# Patient Record
Sex: FEMALE | Race: OTHER | Employment: UNEMPLOYED | URBAN - METROPOLITAN AREA
[De-identification: names, ages, dates, MRNs, and addresses within clinical notes are randomized per-mention and may not be internally consistent; named-entity substitution may affect disease eponyms.]

---

## 2017-10-27 PROBLEM — G56.03 BILATERAL CARPAL TUNNEL SYNDROME: Status: ACTIVE | Noted: 2017-10-27

## 2017-10-27 PROBLEM — E11.9 TYPE 2 DIABETES MELLITUS WITHOUT COMPLICATION, WITHOUT LONG-TERM CURRENT USE OF INSULIN (HCC): Status: ACTIVE | Noted: 2017-10-27

## 2017-10-27 PROBLEM — E78.00 HYPERCHOLESTEREMIA: Status: ACTIVE | Noted: 2017-10-27

## 2018-02-13 PROBLEM — N61.1 BREAST ABSCESS: Status: ACTIVE | Noted: 2018-02-13

## 2018-07-19 ENCOUNTER — OFFICE VISIT (OUTPATIENT)
Dept: INTERNAL MEDICINE | Age: 43
End: 2018-07-19
Payer: COMMERCIAL

## 2018-07-19 VITALS
TEMPERATURE: 98.6 F | SYSTOLIC BLOOD PRESSURE: 112 MMHG | HEART RATE: 60 BPM | RESPIRATION RATE: 16 BRPM | DIASTOLIC BLOOD PRESSURE: 76 MMHG | HEIGHT: 60 IN

## 2018-07-19 DIAGNOSIS — E78.00 HYPERCHOLESTEREMIA: ICD-10-CM

## 2018-07-19 DIAGNOSIS — G56.21 CUBITAL TUNNEL SYNDROME ON RIGHT: ICD-10-CM

## 2018-07-19 DIAGNOSIS — Z00.00 HEALTHCARE MAINTENANCE: ICD-10-CM

## 2018-07-19 DIAGNOSIS — E11.9 TYPE 2 DIABETES MELLITUS WITHOUT COMPLICATION, WITHOUT LONG-TERM CURRENT USE OF INSULIN (HCC): Primary | ICD-10-CM

## 2018-07-19 LAB — HBA1C MFR BLD: 7.7 %

## 2018-07-19 PROCEDURE — 4004F PT TOBACCO SCREEN RCVD TLK: CPT | Performed by: INTERNAL MEDICINE

## 2018-07-19 PROCEDURE — 99214 OFFICE O/P EST MOD 30 MIN: CPT | Performed by: INTERNAL MEDICINE

## 2018-07-19 PROCEDURE — G8417 CALC BMI ABV UP PARAM F/U: HCPCS | Performed by: INTERNAL MEDICINE

## 2018-07-19 PROCEDURE — G8427 DOCREV CUR MEDS BY ELIG CLIN: HCPCS | Performed by: INTERNAL MEDICINE

## 2018-07-19 PROCEDURE — 2022F DILAT RTA XM EVC RTNOPTHY: CPT | Performed by: INTERNAL MEDICINE

## 2018-07-19 PROCEDURE — 3045F PR MOST RECENT HEMOGLOBIN A1C LEVEL 7.0-9.0%: CPT | Performed by: INTERNAL MEDICINE

## 2018-07-19 PROCEDURE — 99212 OFFICE O/P EST SF 10 MIN: CPT | Performed by: INTERNAL MEDICINE

## 2018-07-19 PROCEDURE — 83036 HEMOGLOBIN GLYCOSYLATED A1C: CPT | Performed by: INTERNAL MEDICINE

## 2018-07-19 RX ORDER — GLUCOSAMINE HCL/CHONDROITIN SU 500-400 MG
CAPSULE ORAL
Qty: 100 STRIP | Refills: 3 | Status: SHIPPED | OUTPATIENT
Start: 2018-07-19 | End: 2018-10-17 | Stop reason: SDUPTHER

## 2018-07-19 RX ORDER — ATORVASTATIN CALCIUM 20 MG/1
20 TABLET, FILM COATED ORAL DAILY
Qty: 30 TABLET | Refills: 3 | Status: SHIPPED | OUTPATIENT
Start: 2018-07-19 | End: 2018-10-17 | Stop reason: SDUPTHER

## 2018-07-19 RX ORDER — ALOGLIPTIN 25 MG/1
25 TABLET, FILM COATED ORAL DAILY
Qty: 30 TABLET | Refills: 3 | Status: SHIPPED | OUTPATIENT
Start: 2018-07-19 | End: 2018-10-17 | Stop reason: SDUPTHER

## 2018-07-19 RX ORDER — LANCETS 30 GAUGE
EACH MISCELLANEOUS
Qty: 100 EACH | Refills: 3 | Status: SHIPPED | OUTPATIENT
Start: 2018-07-19 | End: 2018-10-17 | Stop reason: SDUPTHER

## 2018-07-19 RX ORDER — ALBUTEROL SULFATE 90 UG/1
2 AEROSOL, METERED RESPIRATORY (INHALATION) EVERY 6 HOURS PRN
Qty: 1 INHALER | Status: CANCELLED | OUTPATIENT
Start: 2018-07-19

## 2018-07-19 ASSESSMENT — ENCOUNTER SYMPTOMS
RESPIRATORY NEGATIVE: 1
DIARRHEA: 0

## 2018-07-19 NOTE — PROGRESS NOTES
Discharge instructions given. by Dr Erickson Click Patient verbalizes understanding.
A1C)  -     Lancets MISC; Monitor blood glucose every day or every other day  -     blood glucose monitor strips; Monitor blood glucose every day or every other day  -     alogliptin (NESINA) 25 MG TABS tablet; Take 1 tablet by mouth daily  -     metFORMIN (GLUCOPHAGE) 500 MG tablet; Take 1.5 tablets by mouth 2 times daily (with meals)  -     Lashonda Burris MD ([facility])  -     MICROALBUMIN / CREATININE URINE RATIO; Future  - Foot exam done 3 months ago  - Eye exam done but not a diabetes eye exam    Hypercholesteremia  -     atorvastatin (LIPITOR) 20 MG tablet; Take 1 tablet by mouth daily    Healthcare maintenance  -     HIV-1 AND HIV-2 ANTIBODIES; Future    Cubital tunnel syndrome on right  -     Misc. Devices MISC; Elbow splint    Other orders  -     Cancel: albuterol sulfate HFA (VENTOLIN HFA) 108 (90 Base) MCG/ACT inhaler;  Inhale 2 puffs into the lungs every 6 hours as needed for Wheezing    HM:  Check HIV    Erika Shi MD

## 2018-07-19 NOTE — PATIENT INSTRUCTIONS
yanez equilibrio. Las pastillas para dormir o los sedantes pueden afectar yanez equilibrio. · Limite la cantidad de alcohol que rashawn. El alcohol puede alterar yanez equilibrio y otros sentidos. · Pregúntele a yanez médico si los callos o las callosidades deben ser eliminados de kaylan pies. Si [place] un calzado holgado a causa de los callos o las callosidades, podría perder el equilibrio y caerse. · Hable con yanez médico si tiene entumecimiento en los pies. [address] [place] [address] en el hogar  · Quite escalones en la elmer de entrada, alfombrillas y obstáculos. Fije las alfombras sueltas o repare las áreas levantadas del piso. · Mueva los muebles y los cables eléctricos para que no estén en los pasillos. · Utilice cera antideslizante para pisos, y seque de inmediato cualquier derrame que se produzca, sobre todo si el piso es de baldosas de cerámica. · Si [place] elina andadera o un bastón, colóquele un revestimiento de goma en las puntas. Si utiliza muletas, limpie la base regularmente con un paño abrasivo, flora un estropajo de jacquelyn. · Mantenga la casa aminta maricruz, sobre todo las Mercedes, los porches y los pasillos exteriores. Utilice lamparitas nocturnas en áreas flora vestíbulos y brad. Ponga interruptores de britt adicionales o utilice interruptores a distancia (flora los que se encienden o apagan al aplaudir) para que sea más fácil encender las luces si tiene que levantarse por las noches. · Instale pasamanos o barandillas sólidos en las escaleras. · Ponga los artículos que más utiliza en los estantes bajos de los gabinetes (aproximadamente a la altura de yanez cintura). · Tenga un tel[facility]fono [facility] y [facility] linterna con baterías nuevas cerca de yanez cama. Si es posible, coloque un teléfono en cada elina de las habitaciones de yanez casa, o lleve siempre un celular en naveen de que se caiga y no pueda llegar al teléfono.  O puede usar un dispositivo en el alessandro o la edmar en el que presione un botón para enviar elina señal pidiendo ayuda.  · Use zapatos de tacón bajo que le queden aminta y le den buen apoyo a kaylan pies. Use calzado con suelas antideslizantes. Revise los tacones y las suelas de kaylan zapatos antes de usarlos. Repare o reemplace los tacones o las suelas desgastados. · No camine en medias sobre pisos [facility]. · Camine por el [facility] aceras estén resbalosas. Si vive en eilna localidad donde hay yocasta y hielo en invierno, coloque sal sobre los escalones y las aceras resbaladizos. Cómo prevenir las caídas en el baño  · Instale agarraderas y tapetes antideslizantes dentro y fuera de la ducha o la lou, así flora cerca del inodoro y el lavabo. · Utilice elina silla para la ducha y un banco para la bañera. · Use elina khoa de ducha portátil que le permite sentarse mientras se ducha. · Cuando vaya a entrar en la lou o la ducha, coloque felisa la pierna más débil. Cuando vaya a salir de la ducha o la lou, hágalo felisa con el lado más chester. · Repare los asientos de inodoro sueltos y considere instalar un asiento de inodoro elevado para que sea más fácil sentarse y levantarse del inodoro. · No cierre con llave la elmer del baño mientras se ducha. ¿Dónde puede encontrar más información en inglés? Gleda Flax a [email or web address] e ingrese a yanez cuenta de Silvia Langford A679 en el cuadro \"Search Health Information\" para más información (en inglés) sobre \"Prevención de caídas: Instrucciones de cuidado - [ Preventing Falls: Care Instructions ]. \"     Si no tiene elina cuenta, zachary payton en el enlace \"Sign Up Now\". Revisado: 17 malcolm, 56  Versión del contenido: 11.6  © 6456-1024 Healthwise, Incorporated. Las instrucciones de cuidado fueron adaptadas bajo licencia por [facility]ARE ([facility]). Si usted tiene [place] afección médica o sobre estas instrucciones, siempre pregunte a yanez profesional de sujata. [employer] niega toda garantía o responsabilidad por yanez uso de esta información.      Please have el estiramiento. No debe tener síntomas mientras hace el estiramiento. Si no puede reducirlo lo suficiente flora para poder hacer el ejercicio sin síntomas, interrumpa el ejercicio de inmediato. 1. Párese con los brazos relajados a los lados. 2. Con el brazo afectado, doble suavemente el codo [place] y [place] usted tanto flora pueda. 3. Luego enderece el brazo tanto flora sea posible. 4. Repita de 2 a 4 veces. Estiramiento del flexor de la edmar    Si parish ejercicio le causa entumecimiento, hormigueo o dolor en la mano, reduzca el estiramiento. No debe tener síntomas mientras hace el estiramiento. Si no puede reducirlo lo suficiente flora para poder hacer el ejercicio sin síntomas, interrumpa el ejercicio de inmediato. 1. Extienda el brazo afectado hacia adelante con la aj hacia afuera del cuerpo. 2. Doble hacia atrás la [employer] de la mano afectada y apunte con la mano hacia el techo. 3. Con la otra mano, doble con suavidad la edmar aún más hasta que sienta un estiramiento entre leve y moderado en el antebrazo. 4. Mantenga esta posición por lo menos de 15 a 30 segundos. 5. Repita de 2 a 4 veces. 6. Repita los pasos del 1 al 5, manolo esta vez extienda el brazo afectado hacia adelante con la aj [place] arriba. Después doble la edmar hacia atrás y apunte con la mano hacia el piso. Pinkie [place] y extensión de la edmar    Si parish ejercicio le causa entumecimiento, hormigueo o dolor en la mano, reduzca el estiramiento. No debe tener síntomas mientras hace el estiramiento. Si no puede reducirlo lo suficiente flora para poder hacer el ejercicio sin síntomas, interrumpa el ejercicio de inmediato. 1. Coloque el antebrazo sobre elina cooper, con la edmar y la mano afectadas extendidas fuera de la cooper y la aj [place]. 2. Doble lentamente la edmar para  la mano hacia arriba y permitir que yanez mano se cierre en un puño. Mantenga la posición abner unos 6 segundos.   3. Luego baje la mano y permita que kaylan

## 2018-07-23 ENCOUNTER — HOSPITAL ENCOUNTER (OUTPATIENT)
Age: 43
Discharge: HOME | End: 2018-07-23
Payer: COMMERCIAL

## 2018-07-23 DIAGNOSIS — E11.9 TYPE 2 DIABETES MELLITUS WITHOUT COMPLICATION, WITHOUT LONG-TERM CURRENT USE OF INSULIN (HCC): ICD-10-CM

## 2018-07-23 DIAGNOSIS — Z00.00 HEALTHCARE MAINTENANCE: ICD-10-CM

## 2018-07-23 LAB
CREATININE URINE: 126 MG/DL (ref 29–226)
MICROALBUMIN UR-MCNC: <12 MG/L
MICROALBUMIN/CREAT UR-RTO: ABNORMAL (ref 0–30)

## 2018-07-23 PROCEDURE — 86703 HIV-1/HIV-2 1 RESULT ANTBDY: CPT

## 2018-07-23 PROCEDURE — 82570 ASSAY OF URINE CREATININE: CPT

## 2018-07-23 PROCEDURE — 82044 UR ALBUMIN SEMIQUANTITATIVE: CPT

## 2018-07-23 PROCEDURE — 36415 COLL VENOUS BLD VENIPUNCTURE: CPT

## 2018-07-24 LAB — HIV-1 AND HIV-2 ANTIBODIES: NORMAL

## 2018-09-27 ENCOUNTER — HOSPITAL ENCOUNTER (EMERGENCY)
Age: 43
Discharge: HOME | End: 2018-09-28
Payer: COMMERCIAL

## 2018-09-27 ENCOUNTER — APPOINTMENT (OUTPATIENT)
Dept: CT IMAGING | Age: 43
End: 2018-09-27
Payer: COMMERCIAL

## 2018-09-27 DIAGNOSIS — R51.9 SINUS HEADACHE: ICD-10-CM

## 2018-09-27 DIAGNOSIS — R51.9 NONINTRACTABLE HEADACHE, UNSPECIFIED CHRONICITY PATTERN, UNSPECIFIED HEADACHE TYPE: Primary | ICD-10-CM

## 2018-09-27 DIAGNOSIS — K08.89 PAIN, DENTAL: ICD-10-CM

## 2018-09-27 LAB
CHP ED QC CHECK: YES
PREGNANCY TEST URINE, POC: NEGATIVE

## 2018-09-27 PROCEDURE — 6370000000 HC RX 637 (ALT 250 FOR IP): Performed by: PHYSICIAN ASSISTANT

## 2018-09-27 PROCEDURE — 2580000003 HC RX 258: Performed by: PHYSICIAN ASSISTANT

## 2018-09-27 PROCEDURE — 70450 CT HEAD/BRAIN W/O DYE: CPT

## 2018-09-27 PROCEDURE — 96375 TX/PRO/DX INJ NEW DRUG ADDON: CPT

## 2018-09-27 PROCEDURE — 99284 EMERGENCY DEPT VISIT MOD MDM: CPT

## 2018-09-27 PROCEDURE — 96374 THER/PROPH/DIAG INJ IV PUSH: CPT

## 2018-09-27 PROCEDURE — 6360000002 HC RX W HCPCS: Performed by: PHYSICIAN ASSISTANT

## 2018-09-27 RX ORDER — METOCLOPRAMIDE HYDROCHLORIDE 5 MG/ML
10 INJECTION INTRAMUSCULAR; INTRAVENOUS ONCE
Status: COMPLETED | OUTPATIENT
Start: 2018-09-27 | End: 2018-09-27

## 2018-09-27 RX ORDER — DIPHENHYDRAMINE HYDROCHLORIDE 50 MG/ML
25 INJECTION INTRAMUSCULAR; INTRAVENOUS ONCE
Status: COMPLETED | OUTPATIENT
Start: 2018-09-27 | End: 2018-09-27

## 2018-09-27 RX ORDER — 0.9 % SODIUM CHLORIDE 0.9 %
1000 INTRAVENOUS SOLUTION INTRAVENOUS ONCE
Status: COMPLETED | OUTPATIENT
Start: 2018-09-27 | End: 2018-09-28

## 2018-09-27 RX ORDER — AMOXICILLIN 250 MG/1
500 CAPSULE ORAL ONCE
Status: COMPLETED | OUTPATIENT
Start: 2018-09-27 | End: 2018-09-27

## 2018-09-27 RX ORDER — KETOROLAC TROMETHAMINE 30 MG/ML
30 INJECTION, SOLUTION INTRAMUSCULAR; INTRAVENOUS ONCE
Status: COMPLETED | OUTPATIENT
Start: 2018-09-27 | End: 2018-09-27

## 2018-09-27 RX ORDER — BUTALBITAL, ACETAMINOPHEN AND CAFFEINE 300; 40; 50 MG/1; MG/1; MG/1
1 CAPSULE ORAL EVERY 6 HOURS PRN
Qty: 20 CAPSULE | Refills: 0 | Status: SHIPPED | OUTPATIENT
Start: 2018-09-27 | End: 2020-03-06

## 2018-09-27 RX ORDER — AMOXICILLIN 500 MG/1
500 CAPSULE ORAL 3 TIMES DAILY
Qty: 30 CAPSULE | Refills: 0 | Status: SHIPPED | OUTPATIENT
Start: 2018-09-27 | End: 2018-10-07

## 2018-09-27 RX ADMIN — DIPHENHYDRAMINE HYDROCHLORIDE 25 MG: 50 INJECTION, SOLUTION INTRAMUSCULAR; INTRAVENOUS at 22:18

## 2018-09-27 RX ADMIN — KETOROLAC TROMETHAMINE 30 MG: 30 INJECTION, SOLUTION INTRAMUSCULAR at 22:18

## 2018-09-27 RX ADMIN — METOCLOPRAMIDE 10 MG: 5 INJECTION, SOLUTION INTRAMUSCULAR; INTRAVENOUS at 22:18

## 2018-09-27 RX ADMIN — AMOXICILLIN 500 MG: 250 CAPSULE ORAL at 23:23

## 2018-09-27 RX ADMIN — SODIUM CHLORIDE 1000 ML: 9 INJECTION, SOLUTION INTRAVENOUS at 22:18

## 2018-09-27 ASSESSMENT — PAIN SCALES - GENERAL: PAINLEVEL_OUTOF10: 10

## 2018-09-28 VITALS
RESPIRATION RATE: 14 BRPM | HEART RATE: 66 BPM | WEIGHT: 163 LBS | DIASTOLIC BLOOD PRESSURE: 78 MMHG | BODY MASS INDEX: 30.78 KG/M2 | TEMPERATURE: 97.8 F | OXYGEN SATURATION: 97 % | SYSTOLIC BLOOD PRESSURE: 132 MMHG | HEIGHT: 61 IN

## 2018-09-28 NOTE — ED PROVIDER NOTES
Independent:    HPI:  18, Time: . Ashley Sanchez is a 36 y.o. female presenting to the ED for evaluation of right sided headache,  beginning severe earlier this morning. Patient denies any recent cough or cold symptoms. Patient having pain to right sided of head as well as right face and teeth area. She is unsure of last time she saw a dentist. She denies any fever, no nasal congestion, sore throat or ear pain. She denies any visual changes and no neck stiffness or rigidity. The complaint has been persistent  in severity, and worsened by nothing. She denies any associated nausea or vomiting, no weakness or numbness of her upper or lower extremities. ROS:   Pertinent positives and negatives are stated within the HPI, all other systems reviewed and are negative.    --------------------------------------------- PAST HISTORY ---------------------------------------------  Past Medical History:  has a past medical history of Gestational diabetes; Migraine; and Type 2 diabetes mellitus without complication ([facility] [address] Past Surgical History:  has a past surgical history that includes  section. Social History:  reports that she has been smoking Cigarettes. She has a 10.00 pack-year smoking history. She has never used smokeless tobacco. She reports that she drinks alcohol. She reports that she does not use drugs. Family History: family history includes Cancer (age of onset: 48) in her paternal uncle. The patients home medications have been reviewed. Allergies: Patient has no known allergies. -------------------------------------------------- RESULTS -------------------------------------------------  All laboratory and radiology results have been personally reviewed by myself   LABS:  Results for orders placed or performed during the hospital encounter of 18   POC Pregnancy Urine Qual   Result Value Ref Range    Preg Test, Ur negative     QC OK?  yes        RADIOLOGY:  Interpreted by Radiologist.  CT Head WO Contrast   Final Result   Unremarkable CT scan of the brain without IV contrast.          ------------------------- NURSING NOTES AND VITALS REVIEWED ---------------------------   The nursing notes within the ED encounter and vital signs as below have been reviewed. /78   Pulse 66   Temp 97.8 °F (36.6 °C)   Resp 14   Ht 5' 1\" (1.549 m)   Wt 163 lb (73.9 kg)   SpO2 97%   BMI 30.80 kg/m²   Oxygen Saturation Interpretation: Normal      ---------------------------------------------------PHYSICAL EXAM--------------------------------------      Constitutional/General: Alert and oriented x3, well appearing, non toxic in NAD  Head: NC/AT  Eyes: PERRL, EOMI, light sensitive on exam. No nystagmus. Ears: TM's clear, no erythema. Nose; no discharge or bleeding  Mouth: Oropharynx clear, tongue midline , handling secretions, no trismus. Right posterior lower molar noted impacted tooth with some gum swelling and local tenderness, no obvious abscess noted. Neck: Supple, full ROM, non tender and no rigidity. Pulmonary: Lungs clear to auscultation bilaterally,  Not in respiratory distress  Cardiovascular:  Regular rate and rhythm, no ectopy. 2+ distal pulses  Abdomen: Soft, non tender, no rebound or guarding. Extremities: Moves all extremities x 4. UE and LE hand  and muscle strength 5/5 equal bilaterally. Warm and well perfused  Skin: warm and dry without rash  Neurologic: GCS 15, CN 2 through 12 grossly intact.   Psych: Normal Affect      ------------------------------ ED COURSE/MEDICAL DECISION MAKING----------------------  Medications   0.9 % sodium chloride bolus (0 mLs Intravenous Stopped 9/28/18 0052)   diphenhydrAMINE (BENADRYL) injection 25 mg (25 mg Intravenous Given 9/27/18 2218)   metoclopramide (REGLAN) injection 10 mg (10 mg Intravenous Given 9/27/18 2218)   ketorolac (TORADOL) injection 30 mg (30 mg Intravenous Given 9/27/18 2218)   amoxicillin (AMOXIL) capsule 500 mg (500 mg Oral Given 9/27/18 1390)       Medical Decision Making:    Patient to ER with complaints of headache, severe and onset this morning. Patient advised of need to check CT of head and meds given for HA- Toradol, Benadryl, Reglan and IVF. 11:12PM Patient feeling improved and CT findings stable. Patient advised of need to see her PCP as well as dental clinic as suspect impacted tooth to right lower posterior molar area. Dose of Amoxil given in ER, BP improved. Recommend Amoxil  Rx as well as Fioricet, but feel current headache from tooth problem. Counseling: The emergency provider has spoken with the patient and spouse and discussed todays results, in addition to providing specific details for the plan of care and counseling regarding the diagnosis and prognosis. Questions are answered at this time and they are agreeable with the plan.      --------------------------------- IMPRESSION AND DISPOSITION ---------------------------------    IMPRESSION  1. Nonintractable headache, unspecified chronicity pattern, unspecified headache type    2. Pain, dental    3.  Sinus headache        DISPOSITION  Disposition: Discharge to home  Patient condition is stable        Maximino Mckeon PA-C  09/28/18 1007

## 2018-10-17 ENCOUNTER — OFFICE VISIT (OUTPATIENT)
Dept: INTERNAL MEDICINE | Age: 43
End: 2018-10-17
Payer: COMMERCIAL

## 2018-10-17 ENCOUNTER — HOSPITAL ENCOUNTER (OUTPATIENT)
Age: 43
Discharge: HOME | End: 2018-10-17
Payer: COMMERCIAL

## 2018-10-17 ENCOUNTER — TELEPHONE (OUTPATIENT)
Dept: INTERNAL MEDICINE | Age: 43
End: 2018-10-17

## 2018-10-17 VITALS
SYSTOLIC BLOOD PRESSURE: 120 MMHG | DIASTOLIC BLOOD PRESSURE: 78 MMHG | RESPIRATION RATE: 14 BRPM | WEIGHT: 159 LBS | TEMPERATURE: 98.6 F | HEART RATE: 64 BPM | HEIGHT: 60 IN | BODY MASS INDEX: 31.22 KG/M2

## 2018-10-17 DIAGNOSIS — R21 MALAR RASH: ICD-10-CM

## 2018-10-17 DIAGNOSIS — G43.109 MIGRAINE WITH AURA AND WITHOUT STATUS MIGRAINOSUS, NOT INTRACTABLE: ICD-10-CM

## 2018-10-17 DIAGNOSIS — E11.9 TYPE 2 DIABETES MELLITUS WITHOUT COMPLICATION, WITHOUT LONG-TERM CURRENT USE OF INSULIN (HCC): ICD-10-CM

## 2018-10-17 DIAGNOSIS — R21 MALAR RASH: Primary | ICD-10-CM

## 2018-10-17 DIAGNOSIS — E78.00 HYPERCHOLESTEREMIA: ICD-10-CM

## 2018-10-17 DIAGNOSIS — K08.9 POOR DENTITION: ICD-10-CM

## 2018-10-17 DIAGNOSIS — H00.012 HORDEOLUM EXTERNUM OF RIGHT LOWER EYELID: ICD-10-CM

## 2018-10-17 LAB
CHOLESTEROL, TOTAL: 150 MG/DL (ref 0–199)
HDLC SERPL-MCNC: 57 MG/DL
LDL CHOLESTEROL CALCULATED: 78 MG/DL (ref 0–99)
TOTAL CK: 55 U/L (ref 20–180)
TRIGL SERPL-MCNC: 76 MG/DL (ref 0–149)
VLDLC SERPL CALC-MCNC: 15 MG/DL

## 2018-10-17 PROCEDURE — 4004F PT TOBACCO SCREEN RCVD TLK: CPT | Performed by: INTERNAL MEDICINE

## 2018-10-17 PROCEDURE — 86038 ANTINUCLEAR ANTIBODIES: CPT

## 2018-10-17 PROCEDURE — 2022F DILAT RTA XM EVC RTNOPTHY: CPT | Performed by: INTERNAL MEDICINE

## 2018-10-17 PROCEDURE — 99215 OFFICE O/P EST HI 40 MIN: CPT | Performed by: INTERNAL MEDICINE

## 2018-10-17 PROCEDURE — G8484 FLU IMMUNIZE NO ADMIN: HCPCS | Performed by: INTERNAL MEDICINE

## 2018-10-17 PROCEDURE — 86225 DNA ANTIBODY NATIVE: CPT

## 2018-10-17 PROCEDURE — 83516 IMMUNOASSAY NONANTIBODY: CPT

## 2018-10-17 PROCEDURE — 99212 OFFICE O/P EST SF 10 MIN: CPT | Performed by: INTERNAL MEDICINE

## 2018-10-17 PROCEDURE — 80061 LIPID PANEL: CPT

## 2018-10-17 PROCEDURE — 3045F PR MOST RECENT HEMOGLOBIN A1C LEVEL 7.0-9.0%: CPT | Performed by: INTERNAL MEDICINE

## 2018-10-17 PROCEDURE — G8427 DOCREV CUR MEDS BY ELIG CLIN: HCPCS | Performed by: INTERNAL MEDICINE

## 2018-10-17 PROCEDURE — 82550 ASSAY OF CK (CPK): CPT

## 2018-10-17 PROCEDURE — 86235 NUCLEAR ANTIGEN ANTIBODY: CPT

## 2018-10-17 PROCEDURE — 36415 COLL VENOUS BLD VENIPUNCTURE: CPT

## 2018-10-17 PROCEDURE — G8417 CALC BMI ABV UP PARAM F/U: HCPCS | Performed by: INTERNAL MEDICINE

## 2018-10-17 RX ORDER — DEXAMETHASONE 0.5 MG/1
0.5 TABLET ORAL EVERY 6 HOURS
Qty: 20 TABLET | Refills: 0 | Status: SHIPPED | OUTPATIENT
Start: 2018-10-17 | End: 2018-10-22

## 2018-10-17 RX ORDER — ALOGLIPTIN 25 MG/1
25 TABLET, FILM COATED ORAL DAILY
Qty: 30 TABLET | Refills: 3 | Status: SHIPPED | OUTPATIENT
Start: 2018-10-17 | End: 2018-12-07 | Stop reason: SDUPTHER

## 2018-10-17 RX ORDER — LANCETS 30 GAUGE
EACH MISCELLANEOUS
Qty: 100 EACH | Refills: 3 | Status: SHIPPED
Start: 2018-10-17 | End: 2020-03-06 | Stop reason: SDUPTHER

## 2018-10-17 RX ORDER — ATORVASTATIN CALCIUM 20 MG/1
20 TABLET, FILM COATED ORAL DAILY
Qty: 30 TABLET | Refills: 3 | Status: SHIPPED | OUTPATIENT
Start: 2018-10-17 | End: 2018-12-07 | Stop reason: SDUPTHER

## 2018-10-17 RX ORDER — TOPIRAMATE 25 MG/1
25 TABLET ORAL 2 TIMES DAILY
Qty: 60 TABLET | Refills: 1 | Status: SHIPPED | OUTPATIENT
Start: 2018-10-17 | End: 2020-03-06

## 2018-10-17 RX ORDER — GLUCOSAMINE HCL/CHONDROITIN SU 500-400 MG
CAPSULE ORAL
Qty: 100 STRIP | Refills: 3 | Status: SHIPPED | OUTPATIENT
Start: 2018-10-17 | End: 2018-12-07 | Stop reason: SDUPTHER

## 2018-10-17 RX ORDER — TRIAMCINOLONE ACETONIDE 1 MG/G
CREAM TOPICAL
Qty: 1 TUBE | Refills: 1 | Status: SHIPPED | OUTPATIENT
Start: 2018-10-17

## 2018-10-17 ASSESSMENT — ENCOUNTER SYMPTOMS
BACK PAIN: 0
SORE THROAT: 0
EYE DISCHARGE: 0
ANAL BLEEDING: 0
VOMITING: 0
WHEEZING: 0
EYE ITCHING: 0
COLOR CHANGE: 1
CONSTIPATION: 0
COUGH: 0
ABDOMINAL PAIN: 0
SINUS PRESSURE: 0
CHEST TIGHTNESS: 0
NAUSEA: 0
FACIAL SWELLING: 0
SHORTNESS OF BREATH: 0
EYE PAIN: 0
BLOOD IN STOOL: 0
DIARRHEA: 0
PHOTOPHOBIA: 1
SINUS PAIN: 0

## 2018-10-17 ASSESSMENT — PATIENT HEALTH QUESTIONNAIRE - PHQ9
SUM OF ALL RESPONSES TO PHQ QUESTIONS 1-9: 0
SUM OF ALL RESPONSES TO PHQ QUESTIONS 1-9: 0
SUM OF ALL RESPONSES TO PHQ9 QUESTIONS 1 & 2: 0
2. FEELING DOWN, DEPRESSED OR HOPELESS: 0
1. LITTLE INTEREST OR PLEASURE IN DOING THINGS: 0

## 2018-10-17 NOTE — PROGRESS NOTES
Discharge instructions reviewed with patient. Patient verbalizes understanding. AVS given.  used by physician #[id number] and [id number].

## 2018-10-17 NOTE — PROGRESS NOTES
[facility]    Attending Physician Statement:  Sarah Núñez M.D., F.A.C.P. I have discussed the case, including pertinent history and exam findings with the resident. I have seen and examined the patient and the key elements of the encounter have been performed by me. I agree with the assessment, plan and orders as documented by the resident. Patient is seen for fu visit today. Last office notes reviewed, relative labs and imaging. Stye lower eye lid s/p Sunday now Cheek rash-- rxn? From cream  New reaction to lower lid-- rule out dermato    Cheek rash 2-3 months and rash across chest.  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 Chronic R shoulder pain  chroinc now daily HA --typical intermittent complex migraines)but not pounding, not pulsatile, noted b/l across forehead (+numbness fingertips with HA onset) x 24 years, intermittent use prior-- now fiorecet x20 days qd. --   New HA present every day same location-- daily phenobarb now ?withdrawal HA Daily nsaids/tylenol for weeks also. - also claims could be dental pain/broken teeth. -- will make dental referral.  Trial prophylactic, inc bmi-- topemax 25mg titrate up +short course decadron -- stop all other withdrawal nsaids, phenobarb/caffeine/foriect. Tylenol OK    dm2 -stable aic 7.7-- not yet out of diabetic meds and statin. Remainder of medical problems as per resident note.

## 2018-10-17 NOTE — PROGRESS NOTES
[facility]  InternalMedicine Residency Program  ACC Note      SUBJECTIVE:  CC: had concerns including Diabetes and Rash (face/ trunk). HPI:Flory Escalona presented to the [facility] for a routine visit. Patient does not speak [place] and all communication was through the telephone . Rash: Has had a malar rash for 2-3 months which spread to her right bottom eyelid on Sunday, she believes that it is a \"stye\" she placed a Anarca cream on it at that time, and once she placed it on her eye it began seeping like when she uses vicks. Ammonium Lactate for the skin condtion; did not place it in her eye. This did not seem to help. The malar rash has occurred yearly around this time and decreases in intensity with winter. She denies any eye pain, changes in vision, scotomas, blurry vision, decreased mobility of the eye, or pain with movement of the eye. She denies any joint, renal, CNS, or serositis symptoms. She confirms eyelid swelling. Headache: BL frontal headache which occurs usually in the AM; associated with \"numbness and tingling in her fingertips\". Confirms aversion to light. Began approximately 2.5 weeks ago. Relieved with fioricet which she has been taking daily since the headaches began. Similar to the migraines she had when she was pregnant. Has been taking NSAIDs and tylenol but states they do not help. Seen in ED and has dental problems including impacted molars and broken teeth. Review of Systems   Constitutional: Negative for chills, diaphoresis, fatigue and fever. HENT: Negative for congestion, ear discharge, ear pain, facial swelling, nosebleeds, sinus pain, sinus pressure, sore throat and tinnitus. Eyes: Positive for photophobia. Negative for pain, discharge, itching and visual disturbance. Respiratory: Negative for cough, chest tightness, shortness of breath and wheezing. Cardiovascular: Negative for chest pain, palpitations and leg swelling. Gastrointestinal: Negative for abdominal pain, anal bleeding, blood in stool, constipation, diarrhea, nausea and vomiting. Endocrine: Negative for polydipsia, polyphagia and polyuria. Genitourinary: Negative for dysuria, flank pain, frequency and urgency. Musculoskeletal: Negative for back pain, gait problem, neck pain and neck stiffness. Skin: Positive for color change and rash. On face and chest     Allergic/Immunologic: Negative for environmental allergies. Neurological: Positive for headaches. Negative for dizziness, tremors, seizures, weakness, light-headedness and numbness. Hematological: Does not bruise/bleed easily. Psychiatric/Behavioral: Negative for behavioral problems, hallucinations, self-injury and suicidal ideas. Outpatient Prescriptions Marked as Taking for the 10/17/18 encounter (Office Visit) with Zacarias Wills., DO   Medication Sig Dispense Refill    alogliptin (NESINA) 25 MG TABS tablet Take 1 tablet by mouth daily 30 tablet 3    atorvastatin (LIPITOR) 20 MG tablet Take 1 tablet by mouth daily 30 tablet 3    Lancets MISC Monitor blood glucose every day or every other day 100 each 3    metFORMIN (GLUCOPHAGE) 500 MG tablet Take 1.5 tablets by mouth 2 times daily (with meals) 60 tablet 3    blood glucose monitor strips Monitor blood glucose every day or every other day 100 strip 3    topiramate (TOPAMAX) 25 MG tablet Take 1 tablet by mouth 2 times daily 60 tablet 1    dexamethasone (DECADRON) 0.5 MG tablet Take 1 tablet by mouth every 6 hours for 5 days 20 tablet 0    triamcinolone (KENALOG) 0.1 % cream Apply topically 2 times daily. Do not apply to face or skin folds, do not use >21 days 1 Tube 1       I have reviewed all pertinent PMHx, PSHx, FamHx, SocialHx, medications, and allergiesand updated history as appropriate.     OBJECTIVE:    VS: /78   Pulse 64   Temp 98.6 °F (37 °C) (Oral)   Resp 14   Ht 5' (1.524 m)   Wt 159 lb (72.1 kg)   LMP 09/15/2018   BMI 31.05 kg/m²   Physical Exam   Constitutional: She appears well-developed and well-nourished. HENT:   Head: Normocephalic and atraumatic. Right Ear: Hearing, tympanic membrane, external ear and ear canal normal. No lacerations. No drainage or swelling. No mastoid tenderness. Tympanic membrane is not injected, not scarred, not perforated, not erythematous and not bulging. Left Ear: Hearing, tympanic membrane, external ear and ear canal normal. No lacerations. No drainage or swelling. No mastoid tenderness. Tympanic membrane is not injected, not scarred, not perforated, not erythematous and not bulging. Nose: No mucosal edema. Right sinus exhibits no maxillary sinus tenderness and no frontal sinus tenderness. Left sinus exhibits no maxillary sinus tenderness and no frontal sinus tenderness. Mouth/Throat: Uvula is midline, oropharynx is clear and moist and mucous membranes are normal. No oral lesions. No trismus in the jaw. Abnormal dentition. Dental caries present. No dental abscesses or uvula swelling. No oropharyngeal exudate. Eyes: Pupils are equal, round, and reactive to light. Conjunctivae and EOM are normal. Right eye exhibits chemosis, exudate and hordeolum. Right eye exhibits no discharge. No foreign body present in the right eye. Left eye exhibits no chemosis, no discharge, no exudate and no hordeolum. No foreign body present in the left eye. Right conjunctiva is not injected. Right conjunctiva has no hemorrhage. Left conjunctiva is not injected. Left conjunctiva has no hemorrhage. No scleral icterus. Fundoscopic exam:       The right eye shows no AV nicking, no exudate, no hemorrhage and no papilledema. The right eye shows no red reflex. The left eye shows no AV nicking, no exudate, no hemorrhage and no papilledema. The left eye shows no red reflex.        Redness no eyelids signifies swollen eye lid; patient also has vesicular seeping lesion on the bottom right eyelid; hyperemic internal eyelid on right    Fundoscopic exam: normal left; normal vasculature on right with ?dark spot on medial retina (nearest to nose)   Neck: Trachea normal. Carotid bruit is not present. No thyroid mass and no thyromegaly present. Cardiovascular: Normal rate, regular rhythm, S1 normal, S2 normal and normal heart sounds. No murmur heard. Pulses:       Dorsalis pedis pulses are 2+ on the right side, and 2+ on the left side. Posterior tibial pulses are 2+ on the right side, and 2+ on the left side. No Edema in BL LE   Pulmonary/Chest: Effort normal. No respiratory distress. She has no decreased breath sounds. She has no wheezes. She has no rhonchi. She has no rales. Abdominal: Soft. Bowel sounds are normal. She exhibits no mass. There is no tenderness. There is no guarding. Musculoskeletal:        Right foot: There is normal range of motion and no deformity. Left foot: There is normal range of motion and no deformity. Feet:   Right Foot:   Protective Sensation: 10 sites tested. 10 sites sensed. Skin Integrity: Negative for ulcer, blister, skin breakdown, erythema, warmth, callus or dry skin. Left Foot:   Protective Sensation: 10 sites tested. 10 sites sensed. Skin Integrity: Negative for ulcer, blister, skin breakdown, erythema, warmth, callus or dry skin. Lymphadenopathy:     She has cervical adenopathy. Right cervical: No superficial cervical, no deep cervical and no posterior cervical adenopathy present. Left cervical: Superficial cervical adenopathy present. No deep cervical adenopathy present. Neurological: She is alert. Skin:   Malar rash across face and maculopapular rash across chest and shoulders BL   Nursing note and vitals reviewed.       ASSESSMENT/PLAN:    Malar rash  -MICHAELLE, CK, dsDNA, anti histone, Anti Loretta, anti smith and anti RNP  -triamcinolone cream 0.1% until next appointment in 2 weeks    Hordeolum externum of right lower eyelid  -Triamcinolone cream 0.1% cream until next appointment in 2 weeks   -referral to Opthalmology     NIDDM2  -A1c 7/2018: 7.7  -repeat at 6 months   -Continue alogliptin and metformin   -7/2018: no albuminuria  -discussed dietary changes, increased exercise, and weight loss     HLD  -Continue atorvastatin 20mg     The 10-year ASCVD risk score (Gail Millan., et al., 2013) is: 5.4%    Values used to calculate the score:      Age: 39 years      Sex: Female      Is Non- : No      Diabetic: Yes      Tobacco smoker: Yes      Systolic Blood Pressure: 776 mmHg      Is BP treated: No      HDL Cholesterol: 45 mg/dL      Total Cholesterol: 183 mg/dL    Migraine with aura and without status migrainosus, not intractable  - stop Fioricet (Butalbital-APAP-caffeine);   -Start taking the Dexamethasone 1 tablet every 6 hours for 5 days   Start taking the Topamax/Topiramate 1 tablet by mouth before bed starting on Monday 10/22/18 for one week, then on Monday 10/29/18 start taking 2 tablets before bed   -will re-evaluate in 2 weeks   -Seen in ED with suspected impacted molar; discussed seeing dentistry for evaluation    Poor Dentition and Cavities  -Dentistry    HCM: 44-54 yo woman  - Cholesterol screening Female >45: ordered   - DM screening if over weight or obese 38-67 yo: current diabetic   - PAP smear every 3 years: per OB/GYN   May do every 5 years if >30 and high risk HPV negative   - ETOH misuse No  -foot exam today  -eye exam soon  -lipid screen today  -flu vaccine at next appointment    Health Maintenance Due   Topic Date Due    Diabetic foot exam  10/07/1984    DTaP/Tdap/Td vaccine (1 - Tdap) 10/07/1993    Pneumococcal med risk (1 of 1 - PPSV23) 10/07/1993    Cervical cancer screen  10/07/1995    Flu vaccine (1) 09/01/2018    Lipid screen  09/13/2018       I have reviewed my findings and recommendations with Jose Shore and Dr Rosita Remy, DO PGY-1   10/17/2018 9:53

## 2018-10-18 LAB — ANTI-NUCLEAR ANTIBODY (ANA): NEGATIVE

## 2018-10-19 LAB
ANTI DNA DOUBLE STRANDED: NEGATIVE
ANTI JO-1 IGG: NEGATIVE
ENA TO RNP ANTIBODY: NEGATIVE
ENA TO SMITH (SM) ANTIBODY: NEGATIVE

## 2018-10-20 LAB — HISTONE ANTIBODY IGG: 0.1 UNITS (ref 0–0.9)

## 2018-10-29 ENCOUNTER — OFFICE VISIT (OUTPATIENT)
Dept: INTERNAL MEDICINE | Age: 43
End: 2018-10-29
Payer: COMMERCIAL

## 2018-10-29 DIAGNOSIS — M25.521 ELBOW PAIN, RIGHT: Primary | ICD-10-CM

## 2018-10-29 DIAGNOSIS — Z00.00 HEALTH CARE MAINTENANCE: ICD-10-CM

## 2018-10-29 DIAGNOSIS — E11.9 TYPE 2 DIABETES MELLITUS WITHOUT COMPLICATION, WITHOUT LONG-TERM CURRENT USE OF INSULIN (HCC): ICD-10-CM

## 2018-10-29 DIAGNOSIS — G56.03 BILATERAL CARPAL TUNNEL SYNDROME: ICD-10-CM

## 2018-10-29 DIAGNOSIS — Z23 NEED FOR INFLUENZA VACCINATION: ICD-10-CM

## 2018-10-29 PROCEDURE — 99214 OFFICE O/P EST MOD 30 MIN: CPT | Performed by: INTERNAL MEDICINE

## 2018-10-29 PROCEDURE — 3045F PR MOST RECENT HEMOGLOBIN A1C LEVEL 7.0-9.0%: CPT | Performed by: INTERNAL MEDICINE

## 2018-10-29 PROCEDURE — G8427 DOCREV CUR MEDS BY ELIG CLIN: HCPCS | Performed by: INTERNAL MEDICINE

## 2018-10-29 PROCEDURE — 90686 IIV4 VACC NO PRSV 0.5 ML IM: CPT

## 2018-10-29 PROCEDURE — 4004F PT TOBACCO SCREEN RCVD TLK: CPT | Performed by: INTERNAL MEDICINE

## 2018-10-29 PROCEDURE — 6360000002 HC RX W HCPCS

## 2018-10-29 PROCEDURE — G8417 CALC BMI ABV UP PARAM F/U: HCPCS | Performed by: INTERNAL MEDICINE

## 2018-10-29 PROCEDURE — G8482 FLU IMMUNIZE ORDER/ADMIN: HCPCS | Performed by: INTERNAL MEDICINE

## 2018-10-29 PROCEDURE — 2022F DILAT RTA XM EVC RTNOPTHY: CPT | Performed by: INTERNAL MEDICINE

## 2018-10-29 PROCEDURE — G0008 ADMIN INFLUENZA VIRUS VAC: HCPCS

## 2018-10-29 PROCEDURE — 99212 OFFICE O/P EST SF 10 MIN: CPT | Performed by: INTERNAL MEDICINE

## 2018-10-29 ASSESSMENT — ENCOUNTER SYMPTOMS
COUGH: 0
CONSTIPATION: 0
NAUSEA: 0
VOMITING: 0
WHEEZING: 0
SHORTNESS OF BREATH: 0
ABDOMINAL PAIN: 0
SORE THROAT: 0
EYE DISCHARGE: 0
DIARRHEA: 0

## 2018-10-29 NOTE — PROGRESS NOTES
60 tablet 3    blood glucose monitor strips Monitor blood glucose every day or every other day 100 strip 3    triamcinolone (KENALOG) 0.1 % cream Apply topically 2 times daily. Do not apply to face or skin folds, do not use >21 days 1 Tube 1    Misc. Devices MISC Elbow splint    Dx: Cubital tunnel snydrome 1 Device 0    Misc. Devices MISC Bilateral physiologic wrist splint for carpal tunnel syndrome 1 Device 0    Blood Glucose Monitoring Suppl RENE Available device covered by her insurance 1 Device 0    topiramate (TOPAMAX) 25 MG tablet Take 1 tablet by mouth 2 times daily 60 tablet 1    butalbital-APAP-caffeine -40 MG CAPS per capsule Take 1 capsule by mouth every 6 hours as needed for Headaches or Migraine 20 capsule 0     No current facility-administered medications on file prior to visit. OBJECTIVE:    VS: LMP 09/15/2018   Physical Exam   Constitutional: She is oriented to person, place, and time. She appears well-developed and well-nourished. No distress. HENT:   Head: Normocephalic and atraumatic. Right Ear: External ear normal.   Left Ear: External ear normal.   Mouth/Throat: Oropharynx is clear and moist.   Eyes: Pupils are equal, round, and reactive to light. Conjunctivae and EOM are normal.   Neck: Normal range of motion. Neck supple. No thyromegaly present. Cardiovascular: Normal rate, regular rhythm, normal heart sounds and intact distal pulses. Exam reveals no gallop and no friction rub. No murmur heard. Pulmonary/Chest: Breath sounds normal. No respiratory distress. She has no wheezes. She has no rales. Abdominal: Soft. Bowel sounds are normal. She exhibits no distension. There is no tenderness. Musculoskeletal: Normal range of motion. She exhibits no edema or deformity. Lymphadenopathy:     She has no cervical adenopathy. Neurological: She is alert and oriented to person, place, and time. She has normal reflexes. No cranial nerve deficit.  Coordination normal. Skin: Skin is warm and dry. Psychiatric: She has a normal mood and affect. Her behavior is normal.       ASSESSMENT/PLAN:  Ar Perez was seen today for diabetes mellitus, medication refill, results, rash and finger injury. Diagnoses and all orders for this visit:    Elbow pain, right  -     XR ELBOW RIGHT (2 VIEWS); Future  -     Likely OA    Type 2 diabetes mellitus without complication, without long-term current use of insulin (HCC)        -     It's been well controlled, not to make any medication changes for now        -     Continue same medications        -     Continue to monitor blood glucose at home    Muscle spasm of the neck        -     Stretching exercises given    Bilateral carpal tunnel syndrome   - continue using splints   - Will likely use surgical evaluation if symptoms get worse    Need for influenza vaccination  -     INFLUENZA, QUADV, 3 YRS AND OLDER, IM, PF, PREFILL SYR OR SDV, 0.5ML (FLUZONE QUADV, PF)    Health care maintenance  -     INFLUENZA, QUADV, 3 YRS AND OLDER, IM, PF, PREFILL SYR OR SDV, 0.5ML (FLUZONE QUADV, PF)        RTC: 3 months    I have reviewed myfindings and recommendations with Susannah Fernandez and Dr Lalita Cheng M.D.   Internal Medicine Resident - PGY 2

## 2018-12-07 ENCOUNTER — OFFICE VISIT (OUTPATIENT)
Dept: INTERNAL MEDICINE | Age: 43
End: 2018-12-07
Payer: COMMERCIAL

## 2018-12-07 VITALS
TEMPERATURE: 98.1 F | WEIGHT: 162 LBS | HEART RATE: 55 BPM | BODY MASS INDEX: 30.58 KG/M2 | DIASTOLIC BLOOD PRESSURE: 62 MMHG | RESPIRATION RATE: 16 BRPM | HEIGHT: 61 IN | SYSTOLIC BLOOD PRESSURE: 119 MMHG

## 2018-12-07 DIAGNOSIS — E11.65 TYPE 2 DIABETES MELLITUS WITH HYPERGLYCEMIA, WITHOUT LONG-TERM CURRENT USE OF INSULIN (HCC): ICD-10-CM

## 2018-12-07 DIAGNOSIS — E11.9 TYPE 2 DIABETES MELLITUS WITHOUT COMPLICATION, WITHOUT LONG-TERM CURRENT USE OF INSULIN (HCC): ICD-10-CM

## 2018-12-07 DIAGNOSIS — E78.00 HYPERCHOLESTEREMIA: ICD-10-CM

## 2018-12-07 DIAGNOSIS — J06.9 VIRAL UPPER RESPIRATORY TRACT INFECTION: Primary | ICD-10-CM

## 2018-12-07 LAB — HBA1C MFR BLD: 7.3 %

## 2018-12-07 PROCEDURE — G8417 CALC BMI ABV UP PARAM F/U: HCPCS | Performed by: INTERNAL MEDICINE

## 2018-12-07 PROCEDURE — 2022F DILAT RTA XM EVC RTNOPTHY: CPT | Performed by: INTERNAL MEDICINE

## 2018-12-07 PROCEDURE — G8482 FLU IMMUNIZE ORDER/ADMIN: HCPCS | Performed by: INTERNAL MEDICINE

## 2018-12-07 PROCEDURE — 3045F PR MOST RECENT HEMOGLOBIN A1C LEVEL 7.0-9.0%: CPT | Performed by: INTERNAL MEDICINE

## 2018-12-07 PROCEDURE — 83036 HEMOGLOBIN GLYCOSYLATED A1C: CPT | Performed by: INTERNAL MEDICINE

## 2018-12-07 PROCEDURE — 4004F PT TOBACCO SCREEN RCVD TLK: CPT | Performed by: INTERNAL MEDICINE

## 2018-12-07 PROCEDURE — G8427 DOCREV CUR MEDS BY ELIG CLIN: HCPCS | Performed by: INTERNAL MEDICINE

## 2018-12-07 PROCEDURE — 99212 OFFICE O/P EST SF 10 MIN: CPT | Performed by: INTERNAL MEDICINE

## 2018-12-07 PROCEDURE — 99214 OFFICE O/P EST MOD 30 MIN: CPT | Performed by: INTERNAL MEDICINE

## 2018-12-07 RX ORDER — GUAIFENESIN/DEXTROMETHORPHAN 100-10MG/5
5 SYRUP ORAL 3 TIMES DAILY
Qty: 120 ML | Refills: 0 | Status: SHIPPED | OUTPATIENT
Start: 2018-12-07 | End: 2018-12-17

## 2018-12-07 RX ORDER — ATORVASTATIN CALCIUM 20 MG/1
20 TABLET, FILM COATED ORAL DAILY
Qty: 30 TABLET | Refills: 3 | Status: SHIPPED | OUTPATIENT
Start: 2018-12-07 | End: 2019-08-08 | Stop reason: SDUPTHER

## 2018-12-07 RX ORDER — GLUCOSAMINE HCL/CHONDROITIN SU 500-400 MG
CAPSULE ORAL
Qty: 100 STRIP | Refills: 3 | Status: SHIPPED | OUTPATIENT
Start: 2018-12-07 | End: 2019-11-19 | Stop reason: SDUPTHER

## 2018-12-07 RX ORDER — ALOGLIPTIN 25 MG/1
25 TABLET, FILM COATED ORAL DAILY
Qty: 30 TABLET | Refills: 3 | Status: SHIPPED | OUTPATIENT
Start: 2018-12-07 | End: 2019-08-08 | Stop reason: SDUPTHER

## 2018-12-07 RX ORDER — CETIRIZINE HYDROCHLORIDE 10 MG/1
10 TABLET ORAL DAILY
Qty: 30 TABLET | Refills: 0 | Status: SHIPPED | OUTPATIENT
Start: 2018-12-07 | End: 2019-01-06

## 2018-12-07 ASSESSMENT — ENCOUNTER SYMPTOMS
NAUSEA: 0
COUGH: 1
SORE THROAT: 0
WHEEZING: 0
EYE DISCHARGE: 0
ABDOMINAL PAIN: 0
DIARRHEA: 0
CONSTIPATION: 0
RHINORRHEA: 1
VOMITING: 0
SHORTNESS OF BREATH: 0

## 2018-12-07 NOTE — PROGRESS NOTES
[facility]  Internal Medicine Residency Program  [facility] Note      SUBJECTIVE:  CC: had no chief complaint listed for this encounter. HPI:Flory Escalona presented to the [facility] for a routine visit. This is a 39years old female with a significant past medical history of type II diabetes, carpal tunnel syndrome bilateral.     DM on alogliptin, metformin, 119 in am, but her lancets broke about 2 weeks ago. A1C 7.3, down from 7.7 on july    Elbow pain is better, she is now complaining of 4 days of rhinorrhea, watery eyes and cough with clear expectoration in the morning only, no fever or chills, SOB.     No other complaints. Review of Systems   Constitutional: Negative for chills, fatigue and fever. HENT: Positive for rhinorrhea. Negative for congestion, ear discharge and sore throat. Eyes: Negative for discharge. Respiratory: Positive for cough. Negative for shortness of breath and wheezing. Cardiovascular: Negative for chest pain, palpitations and leg swelling. Gastrointestinal: Negative for abdominal pain, constipation, diarrhea, nausea and vomiting. Genitourinary: Negative for dysuria, frequency and urgency. Musculoskeletal: Negative for myalgias. Neurological: Negative for dizziness and headaches. Current Outpatient Prescriptions on File Prior to Visit   Medication Sig Dispense Refill    alogliptin (NESINA) 25 MG TABS tablet Take 1 tablet by mouth daily 30 tablet 3    atorvastatin (LIPITOR) 20 MG tablet Take 1 tablet by mouth daily 30 tablet 3    Lancets MISC Monitor blood glucose every day or every other day 100 each 3    metFORMIN (GLUCOPHAGE) 500 MG tablet Take 1.5 tablets by mouth 2 times daily (with meals) 60 tablet 3    blood glucose monitor strips Monitor blood glucose every day or every other day 100 strip 3    triamcinolone (KENALOG) 0.1 % cream Apply topically 2 times daily.  Do not apply to face or skin folds, do not use >21 days 1 Tube 1    topiramate (TOPAMAX) 25 MG tablet Take 1 tablet by mouth 2 times daily 60 tablet 1    butalbital-APAP-caffeine -40 MG CAPS per capsule Take 1 capsule by mouth every 6 hours as needed for Headaches or Migraine 20 capsule 0    Misc. Devices MISC Elbow splint    Dx: Cubital tunnel snydrome 1 Device 0    Misc. Devices MISC Bilateral physiologic wrist splint for carpal tunnel syndrome 1 Device 0    Blood Glucose Monitoring Suppl RENE Available device covered by her insurance 1 Device 0     No current facility-administered medications on file prior to visit. OBJECTIVE:    VS: /62 (Site: Left Upper Arm, Position: Sitting)   Pulse 55   Temp 98.1 °F (36.7 °C) (Oral)   Resp 16   Ht 5' 1\" (1.549 m)   Wt 162 lb (73.5 kg)   LMP 11/20/2018 (Exact Date)   BMI 30.61 kg/m²   Physical Exam   Constitutional: She is oriented to person, place, and time. She appears well-developed and well-nourished. No distress. HENT:   Head: Normocephalic and atraumatic. Right Ear: External ear normal.   Left Ear: External ear normal.   Mouth/Throat: Oropharynx is clear and moist.   Eyes: Pupils are equal, round, and reactive to light. Conjunctivae and EOM are normal.   Neck: Normal range of motion. Neck supple. No thyromegaly present. Cardiovascular: Normal rate, regular rhythm, normal heart sounds and intact distal pulses. Exam reveals no gallop and no friction rub. No murmur heard. Pulmonary/Chest: Breath sounds normal. No respiratory distress. She has no wheezes. She has no rales. Abdominal: Soft. Bowel sounds are normal. She exhibits no distension. There is no tenderness. Musculoskeletal: Normal range of motion. She exhibits no edema or deformity. Lymphadenopathy:     She has no cervical adenopathy. Neurological: She is alert and oriented to person, place, and time. She has normal reflexes. No cranial nerve deficit. Coordination normal.   Skin: Skin is warm and dry.    Psychiatric: She has a normal mood and

## 2018-12-17 DIAGNOSIS — E11.65 TYPE 2 DIABETES MELLITUS WITH HYPERGLYCEMIA, WITHOUT LONG-TERM CURRENT USE OF INSULIN (HCC): ICD-10-CM

## 2018-12-17 DIAGNOSIS — E11.9 TYPE 2 DIABETES MELLITUS WITHOUT COMPLICATION, WITHOUT LONG-TERM CURRENT USE OF INSULIN (HCC): ICD-10-CM

## 2019-08-08 ENCOUNTER — CARE COORDINATION (OUTPATIENT)
Dept: INTERNAL MEDICINE | Age: 44
End: 2019-08-08

## 2019-08-08 ENCOUNTER — OFFICE VISIT (OUTPATIENT)
Dept: INTERNAL MEDICINE | Age: 44
End: 2019-08-08
Payer: COMMERCIAL

## 2019-08-08 VITALS
DIASTOLIC BLOOD PRESSURE: 75 MMHG | BODY MASS INDEX: 32.26 KG/M2 | HEIGHT: 60 IN | WEIGHT: 164.3 LBS | TEMPERATURE: 98.6 F | HEART RATE: 67 BPM | RESPIRATION RATE: 18 BRPM | SYSTOLIC BLOOD PRESSURE: 136 MMHG

## 2019-08-08 DIAGNOSIS — E78.00 HYPERCHOLESTEREMIA: ICD-10-CM

## 2019-08-08 DIAGNOSIS — Z12.39 BREAST CANCER SCREENING: ICD-10-CM

## 2019-08-08 DIAGNOSIS — H00.012 HORDEOLUM EXTERNUM OF RIGHT LOWER EYELID: ICD-10-CM

## 2019-08-08 DIAGNOSIS — E11.9 TYPE 2 DIABETES MELLITUS WITHOUT COMPLICATION, WITHOUT LONG-TERM CURRENT USE OF INSULIN (HCC): Primary | ICD-10-CM

## 2019-08-08 DIAGNOSIS — Z00.00 HEALTHCARE MAINTENANCE: ICD-10-CM

## 2019-08-08 DIAGNOSIS — G56.03 BILATERAL CARPAL TUNNEL SYNDROME: ICD-10-CM

## 2019-08-08 DIAGNOSIS — Z12.4 CERVICAL CANCER SCREENING: ICD-10-CM

## 2019-08-08 LAB — HBA1C MFR BLD: 7.8 %

## 2019-08-08 PROCEDURE — G8427 DOCREV CUR MEDS BY ELIG CLIN: HCPCS | Performed by: INTERNAL MEDICINE

## 2019-08-08 PROCEDURE — 3045F PR MOST RECENT HEMOGLOBIN A1C LEVEL 7.0-9.0%: CPT | Performed by: INTERNAL MEDICINE

## 2019-08-08 PROCEDURE — 4004F PT TOBACCO SCREEN RCVD TLK: CPT | Performed by: INTERNAL MEDICINE

## 2019-08-08 PROCEDURE — 83036 HEMOGLOBIN GLYCOSYLATED A1C: CPT | Performed by: INTERNAL MEDICINE

## 2019-08-08 PROCEDURE — 99213 OFFICE O/P EST LOW 20 MIN: CPT | Performed by: INTERNAL MEDICINE

## 2019-08-08 PROCEDURE — 99214 OFFICE O/P EST MOD 30 MIN: CPT | Performed by: INTERNAL MEDICINE

## 2019-08-08 PROCEDURE — 2022F DILAT RTA XM EVC RTNOPTHY: CPT | Performed by: INTERNAL MEDICINE

## 2019-08-08 PROCEDURE — G8417 CALC BMI ABV UP PARAM F/U: HCPCS | Performed by: INTERNAL MEDICINE

## 2019-08-08 RX ORDER — BUTALBITAL, ACETAMINOPHEN AND CAFFEINE 300; 40; 50 MG/1; MG/1; MG/1
1 CAPSULE ORAL EVERY 6 HOURS PRN
Qty: 20 CAPSULE | Refills: 0 | Status: CANCELLED | OUTPATIENT
Start: 2019-08-08

## 2019-08-08 RX ORDER — ALOGLIPTIN 25 MG/1
25 TABLET, FILM COATED ORAL DAILY
Qty: 30 TABLET | Refills: 3 | Status: SHIPPED
Start: 2019-08-08 | End: 2020-03-06 | Stop reason: SDUPTHER

## 2019-08-08 RX ORDER — TRIAMCINOLONE ACETONIDE 1 MG/G
CREAM TOPICAL
Qty: 1 TUBE | Refills: 1 | Status: CANCELLED | OUTPATIENT
Start: 2019-08-08

## 2019-08-08 RX ORDER — ATORVASTATIN CALCIUM 20 MG/1
20 TABLET, FILM COATED ORAL DAILY
Qty: 30 TABLET | Refills: 3 | Status: SHIPPED
Start: 2019-08-08 | End: 2020-03-06 | Stop reason: SDUPTHER

## 2019-08-08 NOTE — PATIENT INSTRUCTIONS
Aunque se sienta aminta, dígale a alguien lo que le ocurrió. Es posible que usted no sepa que tiene elina lesión grave. Si no puede levantarse  1. Trate de no entrar en pánico si kala que se ha lesionado después de elina caída o si no puede levantarse. 2. Grite para pedir ayuda. 3. Si hay un teléfono a yanez alcance o si tiene un dispositivo para pedir ayuda en naveen de emergencia, úselo para pedir ayuda. 4. Si no hay un teléfono a yanez alcance, intente deslizarse para alcanzarlo. Si no puede alcanzar el teléfono, intente deslizarse hacia elina elmer o elina ventana o un lugar donde crea que alguien lo oirá. 5. Grite o use un objeto para hacer ruido de modo que alguien pueda oírlo. 6. Si puede alcanzar algún [employer] pueda utilizar de Cate, colóquelo debajo de la khoa. Trate de abrigarse con Hallie Cezar o con ropa mientras espera la ayuda. ¿Cuándo debe pedir ayuda? Llame al 911 en cualquier momento que considere que necesita atención de [place]. Por ejemplo, llame si:    · Se desmayó (perdió el conocimiento).     · No puede levantarse después de elina caída.     · Tiene dolor intenso.    Llame a yanez médico ahora mismo o busque atención médica inmediata si:    · Tiene dolor nuevo o peor.     · Está mareado o aturdido.     · Se golpeó la khoa.    Preste especial atención a los cambios en yanez sujata y asegúrese de comunicarse con yanez médico si:    · No mejora flora se esperaba. ¿Dónde puede encontrar más información en inglés? Dalphine Blackville a [email or web address] e ingrese a yanez cuenta de MyChart. Rosy Rojas H290 en el [employer] \"Search Health Information\" para más información (en inglés) sobre \"Cómo levantarse de Zehra burkett después de Leonel barahona: Instrucciones de cuidado - [ How to Get Up Safely After a Fall: Care Instructions ]. \"     Si no tiene elina cuenta, zachary payton en el enlace \"Sign Up Now\". Revisado: 7 noviembre, 2018  Versión del contenido: 12.1  © 5098-3491 [facility], [employer].  Las instrucciones de

## 2019-08-21 ENCOUNTER — CLINICAL DOCUMENTATION (OUTPATIENT)
Dept: GENERAL RADIOLOGY | Age: 44
End: 2019-08-21

## 2019-08-21 ENCOUNTER — HOSPITAL ENCOUNTER (OUTPATIENT)
Dept: GENERAL RADIOLOGY | Age: 44
Discharge: HOME | End: 2019-08-23
Payer: COMMERCIAL

## 2019-08-21 DIAGNOSIS — Z12.39 BREAST CANCER SCREENING: ICD-10-CM

## 2019-08-21 DIAGNOSIS — Z00.00 HEALTHCARE MAINTENANCE: ICD-10-CM

## 2019-08-21 PROCEDURE — 77063 BREAST TOMOSYNTHESIS BI: CPT

## 2019-08-29 ENCOUNTER — TELEPHONE (OUTPATIENT)
Dept: ONCOLOGY | Age: 44
End: 2019-08-29

## 2019-09-06 ENCOUNTER — HOSPITAL ENCOUNTER (OUTPATIENT)
Dept: GENERAL RADIOLOGY | Age: 44
End: 2019-09-06
Payer: COMMERCIAL

## 2019-09-06 ENCOUNTER — HOSPITAL ENCOUNTER (OUTPATIENT)
Dept: GENERAL RADIOLOGY | Age: 44
Discharge: HOME | End: 2019-09-08
Payer: COMMERCIAL

## 2019-09-06 DIAGNOSIS — R92.8 ABNORMALITY OF RIGHT BREAST ON SCREENING MAMMOGRAM: ICD-10-CM

## 2019-09-06 PROCEDURE — G0279 TOMOSYNTHESIS, MAMMO: HCPCS

## 2019-11-19 DIAGNOSIS — E11.9 TYPE 2 DIABETES MELLITUS WITHOUT COMPLICATION, WITHOUT LONG-TERM CURRENT USE OF INSULIN (HCC): ICD-10-CM

## 2019-11-19 DIAGNOSIS — E11.65 TYPE 2 DIABETES MELLITUS WITH HYPERGLYCEMIA, WITHOUT LONG-TERM CURRENT USE OF INSULIN (HCC): ICD-10-CM

## 2019-11-19 RX ORDER — GLUCOSAMINE HCL/CHONDROITIN SU 500-400 MG
CAPSULE ORAL
Qty: 100 STRIP | Refills: 3 | Status: SHIPPED
Start: 2019-11-19 | End: 2020-03-06 | Stop reason: SDUPTHER

## 2020-03-06 ENCOUNTER — OFFICE VISIT (OUTPATIENT)
Dept: INTERNAL MEDICINE | Age: 45
End: 2020-03-06
Payer: COMMERCIAL

## 2020-03-06 VITALS
RESPIRATION RATE: 18 BRPM | TEMPERATURE: 98.7 F | BODY MASS INDEX: 32.79 KG/M2 | WEIGHT: 167 LBS | HEART RATE: 68 BPM | DIASTOLIC BLOOD PRESSURE: 68 MMHG | HEIGHT: 60 IN | SYSTOLIC BLOOD PRESSURE: 120 MMHG

## 2020-03-06 PROBLEM — N61.1 BREAST ABSCESS: Status: RESOLVED | Noted: 2018-02-13 | Resolved: 2020-03-06

## 2020-03-06 LAB — HBA1C MFR BLD: 8.1 %

## 2020-03-06 PROCEDURE — 6360000002 HC RX W HCPCS

## 2020-03-06 PROCEDURE — 90686 IIV4 VACC NO PRSV 0.5 ML IM: CPT

## 2020-03-06 PROCEDURE — 3052F HG A1C>EQUAL 8.0%<EQUAL 9.0%: CPT | Performed by: INTERNAL MEDICINE

## 2020-03-06 PROCEDURE — 4004F PT TOBACCO SCREEN RCVD TLK: CPT | Performed by: INTERNAL MEDICINE

## 2020-03-06 PROCEDURE — G8427 DOCREV CUR MEDS BY ELIG CLIN: HCPCS | Performed by: INTERNAL MEDICINE

## 2020-03-06 PROCEDURE — G8482 FLU IMMUNIZE ORDER/ADMIN: HCPCS | Performed by: INTERNAL MEDICINE

## 2020-03-06 PROCEDURE — 2022F DILAT RTA XM EVC RTNOPTHY: CPT | Performed by: INTERNAL MEDICINE

## 2020-03-06 PROCEDURE — 83036 HEMOGLOBIN GLYCOSYLATED A1C: CPT | Performed by: INTERNAL MEDICINE

## 2020-03-06 PROCEDURE — G8417 CALC BMI ABV UP PARAM F/U: HCPCS | Performed by: INTERNAL MEDICINE

## 2020-03-06 PROCEDURE — 99213 OFFICE O/P EST LOW 20 MIN: CPT | Performed by: INTERNAL MEDICINE

## 2020-03-06 PROCEDURE — G0008 ADMIN INFLUENZA VIRUS VAC: HCPCS

## 2020-03-06 RX ORDER — PANTOPRAZOLE SODIUM 40 MG/1
40 TABLET, DELAYED RELEASE ORAL
Qty: 30 TABLET | Refills: 2 | Status: SHIPPED | OUTPATIENT
Start: 2020-03-06

## 2020-03-06 RX ORDER — BUTALBITAL, ACETAMINOPHEN AND CAFFEINE 300; 40; 50 MG/1; MG/1; MG/1
1 CAPSULE ORAL EVERY 6 HOURS PRN
Qty: 20 CAPSULE | Status: CANCELLED | OUTPATIENT
Start: 2020-03-06

## 2020-03-06 RX ORDER — ALOGLIPTIN 25 MG/1
25 TABLET, FILM COATED ORAL DAILY
Qty: 30 TABLET | Refills: 2 | Status: SHIPPED
Start: 2020-03-06 | End: 2020-05-29

## 2020-03-06 RX ORDER — GLUCOSAMINE HCL/CHONDROITIN SU 500-400 MG
CAPSULE ORAL
Qty: 100 STRIP | Refills: 2 | Status: SHIPPED | OUTPATIENT
Start: 2020-03-06

## 2020-03-06 RX ORDER — TRIAMCINOLONE ACETONIDE 1 MG/G
CREAM TOPICAL
Qty: 1 TUBE | Status: CANCELLED | OUTPATIENT
Start: 2020-03-06

## 2020-03-06 RX ORDER — TOPIRAMATE 25 MG/1
25 TABLET ORAL 2 TIMES DAILY
Qty: 60 TABLET | Status: CANCELLED | OUTPATIENT
Start: 2020-03-06

## 2020-03-06 RX ORDER — ATORVASTATIN CALCIUM 20 MG/1
20 TABLET, FILM COATED ORAL DAILY
Qty: 30 TABLET | Refills: 2 | Status: SHIPPED | OUTPATIENT
Start: 2020-03-06

## 2020-03-06 RX ORDER — LANCETS 30 GAUGE
EACH MISCELLANEOUS
Qty: 100 EACH | Refills: 5 | Status: SHIPPED | OUTPATIENT
Start: 2020-03-06

## 2020-03-06 SDOH — ECONOMIC STABILITY: FOOD INSECURITY: WITHIN THE PAST 12 MONTHS, THE FOOD YOU BOUGHT JUST DIDN'T LAST AND YOU DIDN'T HAVE MONEY TO GET MORE.: NEVER TRUE

## 2020-03-06 SDOH — ECONOMIC STABILITY: INCOME INSECURITY: HOW HARD IS IT FOR YOU TO PAY FOR THE VERY BASICS LIKE FOOD, HOUSING, MEDICAL CARE, AND HEATING?: NOT HARD AT ALL

## 2020-03-06 SDOH — ECONOMIC STABILITY: FOOD INSECURITY: WITHIN THE PAST 12 MONTHS, YOU WORRIED THAT YOUR FOOD WOULD RUN OUT BEFORE YOU GOT MONEY TO BUY MORE.: NEVER TRUE

## 2020-03-06 NOTE — PROGRESS NOTES
MD [facility]  Internal Medicine Clinic    Attending Physician's Statement  I have discussed the case, including pertinent history and physical exam findings with the medical resident. I agree with the assessment, plan and orders as documented by the resident. I have reviewed all the pertinent PMHx, PSHx, Family Hx, Social Hx, medications and allergies, and updated history as appropriate. Patient here for follow up of DM type 2 and hyperlipidemia. She is on metformin 500 mg BID (was supposed to be taking 750 mg BID). HbA1c IS 8.2% today as compared to 7.8% in August 2019. She is also on Alogliptin 25 mg once daily. Advised her to start taking metformin 750 mg BID. Also counseled the patient about diabetic diet. Patient to continue atorvastatin for hyperlipidemia. Vital signs, pertinent lab results and visit related imaging studies have been reviewed. Patient has bilateral carpal tunnel syndrome - had splints for both wrists. EMG/NCS to be ordered. Orthopedic surgery referral to be given after EMG/NCS results. Patient complaining of GERD symptoms. Will start her on PPI. Medical problems, physical exam, assessment and plan per medical resident's note.     Fabienne Sheffield MD  Internal Medicine Residency Faculty  3/6/2020

## 2020-03-06 NOTE — PROGRESS NOTES
Patient verbalized understanding of office instructions when given in [employer] per Dr. Nichol Ingram   She will call with questions or concerns. She was given discharge instructions, printed in [place] [employer]  All questions were fully answered.  Instructed to get AVS at

## 2020-03-06 NOTE — PROGRESS NOTES
[facility]  Internal Medicine Residency Program  [facility] Note      SUBJECTIVE:  CC: had concerns including Check-Up (medicqtion refills). HPI:Flory Escalona presented to the [facility] for a routine visit. This is a 39years old female with a significant past medical history of type II diabetes, carpal tunnel syndrome bilateral diagnosed by EMG in 2017.     DM on alogliptin, metformin, between 130 - 221 in am. She has been taking metformin 500 mg (supposed to be taking 750 BID) HB A1C went up to 8.1 from 7.8.     Patient reports that she is still having some bilateral tingling numbness sensation of her hand, and her  strength has gotten worse despite of using splints. L>R. EMG was ordered on the last visit but she reports that never got a call to schedule it.     New complaint is ~2 months of GERD like symptoms, no weight loss, dysphagia.      Review of Systems   Constitutional: Negative for chills and fever. HENT: Negative for congestion and sore throat. Eyes: Negative for discharge. Respiratory: Negative for cough, shortness of breath and wheezing. Cardiovascular: Negative for chest pain, palpitations and leg swelling. Gastrointestinal: Negative for abdominal pain, constipation, diarrhea, nausea and vomiting. Aid reflux   Genitourinary: Negative for dysuria, frequency and urgency. Musculoskeletal: Negative for myalgias. Neurological: Positive for numbness. Negative for dizziness, facial asymmetry, light-headedness and headaches. Psychiatric/Behavioral: Negative for agitation and behavioral problems.        Current Outpatient Medications on File Prior to Visit   Medication Sig Dispense Refill    blood glucose monitor strips Monitor blood glucose every day or every other day 100 strip 3    alogliptin (NESINA) 25 MG TABS tablet Take 1 tablet by mouth daily 30 tablet 3    atorvastatin (LIPITOR) 20 MG tablet Take 1 tablet by mouth daily 30 tablet 3    blood glucose test strips (ASCENSIA AUTODISC VI;ONE TOUCH ULTRA TEST VI) strip Monitor blood glucose every day or every other day 100 each 1    metFORMIN (GLUCOPHAGE) 500 MG tablet Take 1.5 tablets by mouth 2 times daily (with meals) 60 tablet 3    Lancets MISC Monitor blood glucose every day or every other day 100 each 3    topiramate (TOPAMAX) 25 MG tablet Take 1 tablet by mouth 2 times daily 60 tablet 1    triamcinolone (KENALOG) 0.1 % cream Apply topically 2 times daily. Do not apply to face or skin folds, do not use >21 days 1 Tube 1    butalbital-APAP-caffeine -40 MG CAPS per capsule Take 1 capsule by mouth every 6 hours as needed for Headaches or Migraine 20 capsule 0    blood glucose monitor kit and supplies OneTouch glucometer 1 kit 0    Misc. Devices MISC Elbow splint    Dx: Cubital tunnel snydrome 1 Device 0    Misc. Devices MISC Bilateral physiologic wrist splint for carpal tunnel syndrome 1 Device 0     No current facility-administered medications on file prior to visit. OBJECTIVE:    VS: /68   Pulse 68   Temp 98.7 °F (37.1 °C) (Oral)   Resp 18   Ht 5' (1.524 m)   Wt 167 lb (75.8 kg)   LMP 02/21/2020   BMI 32.61 kg/m²   Physical Exam  Constitutional: She is oriented to person, place, and time. She appears well-developed. No distress. HENT:   Head: Normocephalic and atraumatic. Eyes: Conjunctivae and EOM are normal. Pupils are equal, round, and reactive to light. Neck: Normal range of motion. Neck supple. No thyromegaly present. Cardiovascular: Normal rate, regular rhythm, normal heart sounds and intact distal pulses.  Exam reveals no gallop and no friction rub.    No murmur heard. Pulmonary/Chest: Breath sounds normal. No respiratory distress. She has no wheezes. She has no rales. Abdominal: Soft. Bowel sounds are normal. She exhibits no distension. There is no tenderness. Musculoskeletal: Normal range of motion. She exhibits no edema or deformity.    Lymphadenopathy:     She has no

## 2020-03-06 NOTE — PROGRESS NOTES
Vaccine Information Sheet, \"Influenza - Inactivated\"  given to Elias Escoto, or parent/legal guardian of  Elias Escoto and verbalized understanding. Patient responses:    Have you ever had a reaction to a flu vaccine? No  Do you have any current illness? No  Have you ever had Guillian Mims Syndrome? No  Do you have a serious allergy to any of the follow: Neomycin, Polymyxin, Thimerosal, eggs or egg products? No    Flu vaccine given per order. Please see immunization tab. Risks and benefits explained. Current VIS given.

## 2020-03-08 ASSESSMENT — ENCOUNTER SYMPTOMS
SHORTNESS OF BREATH: 0
SORE THROAT: 0
NAUSEA: 0
EYE DISCHARGE: 0
ABDOMINAL PAIN: 0
CONSTIPATION: 0
WHEEZING: 0
COUGH: 0
VOMITING: 0
DIARRHEA: 0

## 2020-03-10 ENCOUNTER — HOSPITAL ENCOUNTER (OUTPATIENT)
Age: 45
Discharge: HOME | End: 2020-03-10
Payer: COMMERCIAL

## 2020-03-10 LAB
ANION GAP SERPL CALCULATED.3IONS-SCNC: 15 MMOL/L (ref 7–16)
BUN BLDV-MCNC: 9 MG/DL (ref 6–20)
CALCIUM SERPL-MCNC: 9 MG/DL (ref 8.6–10.2)
CHLORIDE BLD-SCNC: 102 MMOL/L (ref 98–107)
CHOLESTEROL, TOTAL: 139 MG/DL (ref 0–199)
CO2: 21 MMOL/L (ref 22–29)
CREAT SERPL-MCNC: 0.6 MG/DL (ref 0.5–1)
CREATININE URINE: 113 MG/DL (ref 29–226)
GFR AFRICAN AMERICAN: >60
GFR NON-AFRICAN AMERICAN: >60 ML/MIN/1.73
GLUCOSE BLD-MCNC: 210 MG/DL (ref 74–99)
HCT VFR BLD CALC: 45 % (ref 34–48)
HDLC SERPL-MCNC: 47 MG/DL
HEMOGLOBIN: 14.9 G/DL (ref 11.5–15.5)
LDL CHOLESTEROL CALCULATED: 62 MG/DL (ref 0–99)
MCH RBC QN AUTO: 32.8 PG (ref 26–35)
MCHC RBC AUTO-ENTMCNC: 33.1 % (ref 32–34.5)
MCV RBC AUTO: 99.1 FL (ref 80–99.9)
MICROALBUMIN UR-MCNC: 19.7 MG/L
MICROALBUMIN/CREAT UR-RTO: 17.4 (ref 0–30)
PDW BLD-RTO: 12.2 FL (ref 11.5–15)
PLATELET # BLD: 225 E9/L (ref 130–450)
PMV BLD AUTO: 10.3 FL (ref 7–12)
POTASSIUM SERPL-SCNC: 4.1 MMOL/L (ref 3.5–5)
RBC # BLD: 4.54 E12/L (ref 3.5–5.5)
SODIUM BLD-SCNC: 138 MMOL/L (ref 132–146)
TRIGL SERPL-MCNC: 148 MG/DL (ref 0–149)
VLDLC SERPL CALC-MCNC: 30 MG/DL
WBC # BLD: 8.7 E9/L (ref 4.5–11.5)

## 2020-03-10 PROCEDURE — 80061 LIPID PANEL: CPT

## 2020-03-10 PROCEDURE — 82570 ASSAY OF URINE CREATININE: CPT

## 2020-03-10 PROCEDURE — 80048 BASIC METABOLIC PNL TOTAL CA: CPT

## 2020-03-10 PROCEDURE — 82044 UR ALBUMIN SEMIQUANTITATIVE: CPT

## 2020-03-10 PROCEDURE — 85027 COMPLETE CBC AUTOMATED: CPT

## 2020-03-10 PROCEDURE — 36415 COLL VENOUS BLD VENIPUNCTURE: CPT

## 2020-05-29 NOTE — TELEPHONE ENCOUNTER
Insurance no longer prefers Alogliptin. Reviewed with Dr. Vale Kruse and new order received for Januvia 100 mg daily.

## 2020-06-26 ENCOUNTER — TELEPHONE (OUTPATIENT)
Dept: INTERNAL MEDICINE | Age: 45
End: 2020-06-26

## 2021-02-10 ENCOUNTER — TELEPHONE (OUTPATIENT)
Dept: ORTHOPEDIC SURGERY | Age: 46
End: 2021-02-10

## 2021-02-10 DIAGNOSIS — G56.03 BILATERAL CARPAL TUNNEL SYNDROME: Primary | ICD-10-CM

## 2025-07-22 ENCOUNTER — HOSPITAL ENCOUNTER (OUTPATIENT)
Dept: GENERAL RADIOLOGY | Age: 50
End: 2025-07-22
Payer: COMMERCIAL

## 2025-07-22 ENCOUNTER — HOSPITAL ENCOUNTER (OUTPATIENT)
Dept: GENERAL RADIOLOGY | Age: 50
Discharge: HOME | End: 2025-07-24
Payer: COMMERCIAL

## 2025-07-22 VITALS — WEIGHT: 134 LBS | HEIGHT: 60 IN | BODY MASS INDEX: 26.31 KG/M2

## 2025-07-22 DIAGNOSIS — N63.10 MASS OF RIGHT BREAST, UNSPECIFIED QUADRANT: ICD-10-CM

## 2025-07-22 PROCEDURE — 76642 ULTRASOUND BREAST LIMITED: CPT
